# Patient Record
Sex: MALE | Race: WHITE | Employment: OTHER | URBAN - METROPOLITAN AREA
[De-identification: names, ages, dates, MRNs, and addresses within clinical notes are randomized per-mention and may not be internally consistent; named-entity substitution may affect disease eponyms.]

---

## 2017-05-11 ENCOUNTER — HOSPITAL ENCOUNTER (OUTPATIENT)
Age: 62
Setting detail: SPECIMEN
Discharge: HOME | End: 2017-05-11
Payer: COMMERCIAL

## 2017-05-15 LAB — SURGICAL PATHOLOGY REPORT: NORMAL

## 2018-02-05 ENCOUNTER — HOSPITAL ENCOUNTER (OUTPATIENT)
Dept: GENERAL RADIOLOGY | Age: 63
Discharge: HOME | End: 2018-02-07
Payer: COMMERCIAL

## 2018-02-05 ENCOUNTER — OFFICE VISIT (OUTPATIENT)
Dept: PODIATRY | Age: 63
End: 2018-02-05
Payer: COMMERCIAL

## 2018-02-05 VITALS
WEIGHT: 250.6 LBS | DIASTOLIC BLOOD PRESSURE: 78 MMHG | SYSTOLIC BLOOD PRESSURE: 130 MMHG | HEIGHT: 76 IN | HEART RATE: 80 BPM | BODY MASS INDEX: 30.52 KG/M2

## 2018-02-05 DIAGNOSIS — I87.2 CHRONIC VENOUS INSUFFICIENCY: ICD-10-CM

## 2018-02-05 DIAGNOSIS — R60.0 EDEMA OF RIGHT FOOT: ICD-10-CM

## 2018-02-05 DIAGNOSIS — R60.0 EDEMA OF RIGHT FOOT: Primary | ICD-10-CM

## 2018-02-05 PROCEDURE — 73630 X-RAY EXAM OF FOOT: CPT

## 2018-02-05 PROCEDURE — 99202 OFFICE O/P NEW SF 15 MIN: CPT | Performed by: PODIATRIST

## 2018-02-05 RX ORDER — PRIMIDONE 250 MG/1
TABLET ORAL
COMMUNITY
Start: 2017-12-29

## 2018-02-05 RX ORDER — CYANOCOBALAMIN 1000 UG/ML
INJECTION INTRAMUSCULAR; SUBCUTANEOUS
COMMUNITY
Start: 2018-01-09

## 2018-02-05 RX ORDER — TOPIRAMATE 100 MG/1
TABLET, FILM COATED ORAL
COMMUNITY
Start: 2017-12-26

## 2018-02-05 RX ORDER — LEVETIRACETAM 500 MG/1
TABLET ORAL
COMMUNITY
Start: 2017-11-27

## 2018-02-13 ENCOUNTER — HOSPITAL ENCOUNTER (OUTPATIENT)
Dept: NUCLEAR MEDICINE | Age: 63
Discharge: HOME | End: 2018-02-15
Payer: COMMERCIAL

## 2018-02-13 DIAGNOSIS — I87.2 CHRONIC VENOUS INSUFFICIENCY: ICD-10-CM

## 2018-02-13 DIAGNOSIS — R60.0 EDEMA OF RIGHT FOOT: ICD-10-CM

## 2018-02-13 PROCEDURE — 3430000000 HC RX DIAGNOSTIC RADIOPHARMACEUTICAL: Performed by: INTERNAL MEDICINE

## 2018-02-13 PROCEDURE — 78315 BONE IMAGING 3 PHASE: CPT

## 2018-02-13 PROCEDURE — A9503 TC99M MEDRONATE: HCPCS | Performed by: INTERNAL MEDICINE

## 2018-02-13 RX ORDER — TC 99M MEDRONATE 20 MG/10ML
25 INJECTION, POWDER, LYOPHILIZED, FOR SOLUTION INTRAVENOUS
Status: COMPLETED | OUTPATIENT
Start: 2018-02-13 | End: 2018-02-13

## 2018-02-13 RX ADMIN — Medication 25 MILLICURIE: at 11:37

## 2018-02-14 ENCOUNTER — OFFICE VISIT (OUTPATIENT)
Dept: PODIATRY | Age: 63
End: 2018-02-14
Payer: COMMERCIAL

## 2018-02-14 VITALS
BODY MASS INDEX: 30.69 KG/M2 | HEIGHT: 76 IN | SYSTOLIC BLOOD PRESSURE: 120 MMHG | WEIGHT: 252 LBS | HEART RATE: 72 BPM | DIASTOLIC BLOOD PRESSURE: 80 MMHG

## 2018-02-14 DIAGNOSIS — R60.0 EDEMA OF RIGHT FOOT: ICD-10-CM

## 2018-02-14 DIAGNOSIS — I73.9 PVD (PERIPHERAL VASCULAR DISEASE) (HCC): Primary | ICD-10-CM

## 2018-02-14 DIAGNOSIS — M79.671 FOOT PAIN, RIGHT: ICD-10-CM

## 2018-02-14 PROCEDURE — 99213 OFFICE O/P EST LOW 20 MIN: CPT | Performed by: PODIATRIST

## 2018-02-14 NOTE — PROGRESS NOTES
Selfridge-Kiet Monofilament, bilateral.  negative Tinel's, bilateral.  negative Valleix sign, bilateral.  Vibratory intact bilateral.  Reflexes Decreased bilateral.  Paresthesias negative. Dysthesias negative. Sharp/dull intact bilateral.  Musculoskeletal: Muscle strength 5/5, bilateral.  Pain absent upon palpation bilateral. Normal medial longitudinal arch, bilateral.  Ankle ROM decreased,bilateral.  1st MPJ ROM within normal limits, bilateral.  Dorsally contracted digits absent. No other foot deformities. Integument: Warm, dry, supple, bilateral.  Open lesion absent, bilateral.  Interdigital maceration absent to web spaces bilateral.  Nails within normal limits. Fissures absent, bilateral. Hyperkeratotic tissue is absent. Xray/bone scan: see reports    Asessment: Patient is a 60 y.o. male with:   1. PVD (peripheral vascular disease) ([facility] [address]     2. Foot pain, right  VL ANKLE ART BRACHIAL INDICES EXTREMITY BILATERAL    US DUP LOWER EXTREMITY RIGHT JULIO C   3. Edema of right foot         Plan: Patient examined and evaluated. Current condition and treatment options discussed in detail. X rays and bone scan reviewed with the pt in detail. All questions answered. Orders Placed This Encounter   Procedures    VL ANKLE ART BRACHIAL INDICES EXTREMITY BILATERAL     Standing Status:   Future     Standing Expiration Date:   2/14/2019     Order Specific Question:   Reason for exam:     Answer:   right foot barron/pain    US DUP LOWER EXTREMITY RIGHT JULIO C     Patient had 3 phase bone scan on 2/13/18     Standing Status:   Future     Standing Expiration Date:   2/14/2019     Order Specific Question:   Reason for exam:     Answer:   right foot pain/edema   pt educated on RSD, very low suspicion at this point due to lack of pain and no funcitonal issues. His bone scan did have slight julia articular uptake  ACE wrap for compression R leg. Pt to elevate R foot  Contact office with any questions/problems/concerns.   RTC in 1

## 2018-02-21 ENCOUNTER — HOSPITAL ENCOUNTER (OUTPATIENT)
Dept: INTERVENTIONAL RADIOLOGY/VASCULAR | Age: 63
Discharge: HOME | End: 2018-02-23
Payer: COMMERCIAL

## 2018-02-21 ENCOUNTER — OFFICE VISIT (OUTPATIENT)
Dept: PODIATRY | Age: 63
End: 2018-02-21
Payer: COMMERCIAL

## 2018-02-21 ENCOUNTER — APPOINTMENT (OUTPATIENT)
Dept: INTERVENTIONAL RADIOLOGY/VASCULAR | Age: 63
End: 2018-02-21
Payer: COMMERCIAL

## 2018-02-21 VITALS
BODY MASS INDEX: 30.44 KG/M2 | SYSTOLIC BLOOD PRESSURE: 110 MMHG | WEIGHT: 250 LBS | DIASTOLIC BLOOD PRESSURE: 62 MMHG | HEART RATE: 60 BPM | HEIGHT: 76 IN

## 2018-02-21 DIAGNOSIS — I73.9 PVD (PERIPHERAL VASCULAR DISEASE) (HCC): ICD-10-CM

## 2018-02-21 DIAGNOSIS — M79.671 FOOT PAIN, RIGHT: ICD-10-CM

## 2018-02-21 DIAGNOSIS — I87.2 CHRONIC VENOUS INSUFFICIENCY: Primary | ICD-10-CM

## 2018-02-21 PROCEDURE — 93922 UPR/L XTREMITY ART 2 LEVELS: CPT

## 2018-02-21 PROCEDURE — 93971 EXTREMITY STUDY: CPT

## 2018-02-21 PROCEDURE — 99213 OFFICE O/P EST LOW 20 MIN: CPT | Performed by: PODIATRIST

## 2018-02-21 NOTE — PROGRESS NOTES
Subjective: Verna Fraser is a 60 y.o. male who presents to the office today post vascular testing. Pt thinks color R foot has looked better. Symptoms unchanged. Patient relates pain is Absent . Pain is rated 0 out of 10 and is described as none. No new tx tried. Pt interested in hat his testing showed. Currently denies F/C/N/V. Allergies   Allergen Reactions    Demerol Hcl [Meperidine] Nausea And Vomiting       Past Medical History:   Diagnosis Date    Seizures ([facility] [address]        Prior to Admission medications    Medication Sig Start Date End Date Taking? Authorizing Provider   Elastic Bandages & Supports (MEDICAL COMPRESSION STOCKINGS) MISC 1 each by Does not apply route daily 20-30 mmHg knee high  Chronic venous insufficiency  (primary encounter diagnosis)  PVD (peripheral vascular disease) ([facility] [address] 2/21/18  Yes Adoljeffrey Antes, DPM   cyanocobalamin 1000 MCG/ML injection  1/9/18  Yes Historical Provider, MD   levETIRAcetam (KEPPRA) 500 MG tablet  11/27/17  Yes Historical Provider, MD   primidone (MYSOLINE) 250 MG tablet  12/29/17  Yes Historical Provider, MD   topiramate (TOPAMAX) 100 MG tablet  12/26/17  Yes Historical Provider, MD       Past Surgical History:   Procedure Laterality Date    COLECTOMY  02/28/2011       Family History   Problem Relation Age of Onset    Cancer Mother     Cancer Father        Social History   Substance Use Topics    Smoking status: Former Smoker     Types: Cigarettes    Smokeless tobacco: Never Used    Alcohol use No       Review of Systems: All 12 systems reviewed and pertinent positives noted above. Lower Extremity Physical Examination:     Vitals:   Vitals:    02/21/18 1403   BP: 110/62   Pulse: 60     General: AAO x 3 in NAD.    Vascular: DP and PT pulses palpable 2/4, bilateral.   CFT <3 seconds, bilateral.  Hair growth present to the level of the digits, bilateral.  Edema present, bilateral.  Varicosities present, bilateral. Erythema absent, bilateral. Distal